# Patient Record
Sex: MALE | Race: WHITE
[De-identification: names, ages, dates, MRNs, and addresses within clinical notes are randomized per-mention and may not be internally consistent; named-entity substitution may affect disease eponyms.]

---

## 2019-03-29 ENCOUNTER — HOSPITAL ENCOUNTER (OUTPATIENT)
Dept: HOSPITAL 56 - MW.SDS | Age: 59
Discharge: HOME | End: 2019-03-29
Attending: SURGERY
Payer: COMMERCIAL

## 2019-03-29 DIAGNOSIS — Z88.5: ICD-10-CM

## 2019-03-29 DIAGNOSIS — K62.1: ICD-10-CM

## 2019-03-29 DIAGNOSIS — Z79.890: ICD-10-CM

## 2019-03-29 DIAGNOSIS — E03.9: ICD-10-CM

## 2019-03-29 DIAGNOSIS — K20.9: ICD-10-CM

## 2019-03-29 DIAGNOSIS — Z91.030: ICD-10-CM

## 2019-03-29 DIAGNOSIS — Z79.899: ICD-10-CM

## 2019-03-29 DIAGNOSIS — K63.9: ICD-10-CM

## 2019-03-29 DIAGNOSIS — K29.50: ICD-10-CM

## 2019-03-29 DIAGNOSIS — K57.30: ICD-10-CM

## 2019-03-29 DIAGNOSIS — I10: ICD-10-CM

## 2019-03-29 DIAGNOSIS — K21.9: ICD-10-CM

## 2019-03-29 DIAGNOSIS — K64.8: ICD-10-CM

## 2019-03-29 DIAGNOSIS — K62.5: Primary | ICD-10-CM

## 2019-03-29 PROCEDURE — 45380 COLONOSCOPY AND BIOPSY: CPT

## 2019-03-29 PROCEDURE — 43239 EGD BIOPSY SINGLE/MULTIPLE: CPT

## 2019-03-29 NOTE — PCM.PREANE
Preanesthetic Assessment





- Anesthesia/Transfusion/Family Hx


Anesthesia History: Prior Anesthesia Without Reaction


Family History of Anesthesia Reaction: No


Transfusion History: No Prior Transfusion(s)


Intubation History: Unknown





- Review of Systems


General: No Symptoms


Pulmonary: No Symptoms


Cardiovascular: No Symptoms


Gastrointestinal: Abdominal Pain, Difficulty Swallowing, Hematochezia


Neurological: No Symptoms


Other: Reports: None





- Physical Assessment


NPO Status Date: 03/29/19


NPO Status Time: 06:00


O2 Sat by Pulse Oximetry: 98


Respiratory Rate: 15


Vital Signs: 





 Last Vital Signs











Temp  36.4 C   03/29/19 09:20


 


Pulse  57 L  03/29/19 09:20


 


Resp  15   03/29/19 09:20


 


BP  132/85   03/29/19 09:20


 


Pulse Ox  98   03/29/19 09:20











Height: 1.85 m


Weight: 95.708 kg


ASA Class: 2


Mental Status: Alert & Oriented x3


Airway Class: Mallampati = 2


Dentition: Reports: Normal Dentition


Thyro-Mental Finger Breadths: 3


Mouth Opening Finger Breadths: 3


ROM/Head Extension: Full


Lungs: Clear to Auscultation, Normal Respiratory Effort


Cardiovascular: Regular Rate, Regular Rhythm





- Allergies


Allergies/Adverse Reactions: 


 Allergies











Allergy/AdvReac Type Severity Reaction Status Date / Time


 


bee venom protein (honey bee) Allergy  Anaphylactic Verified 03/26/19 08:37





   Shock  


 


codeine Allergy  Itching Verified 03/26/19 08:37














- Blood


Blood Available: No





- Anesthesia Plan


Pre-Op Medication Ordered: None





- Acknowledgements


Anesthesia Type Planned: MAC


Pt an Appropriate Candidate for the Planned Anesthesia: Yes


Alternatives and Risks of Anesthesia Discussed w Pt/Guardian: Yes


Pt/Guardian Understands and Agrees with Anesthesia Plan: Yes





PreAnesthesia Questionnaire


HEENT History: Reports: Other (See Below)


Other HEENT History: wears glasses


Cardiovascular History: Reports: Hypertension


Respiratory History: Reports: None


Gastrointestinal History: Reports: GERD, Other (See Below)


Other Gastrointestinal History: hx C-diff


Genitourinary History: Reports: None


Musculoskeletal History: Reports: Fracture


Other Musculoskeletal History: hx fx arm, collarbone, toe and finger


Neurological History: Reports: None


Psychiatric History: Reports: Anxiety, Depression


Endocrine/Metabolic History: Reports: Hypothyroidism


Hematologic History: Reports: None


Immunologic History: Reports: None


Oncologic (Cancer) History: Reports: None


Dermatologic History: Reports: None





- Past Surgical History


Head Surgeries/Procedures: Reports: None


HEENT Surgical History: Reports: Tonsillectomy


Cardiovascular Surgical History: Reports: None


Respiratory Surgical History: Reports: None


GI Surgical History: Reports: None


Female  Surgical History: Reports: None


Neurological Surgical History: Reports: None


Musculoskeletal Surgical History: Reports: Shoulder Surgery


Other Musculoskeletal Surgeries/Procedures:: herman shoulder surgery


Oncologic Surgical History: Reports: None


Dermatological Surgical History: Reports: None





- SUBSTANCE USE


Smoking Status *Q: Never Smoker


Recreational Drug Use History: No





- HOME MEDS


Home Medications: 


 Home Meds





Ascorbic Acid [Vitamin C] 500 mg PO DAILY 03/26/19 [History]


L.acidoph,Paracasei, B.lactis [Probiotic] 1 tab PO DAILY 03/26/19 [History]


Levothyroxine Sodium [Synthroid] 150 mcg PO DAILY 03/26/19 [History]


Lisinopril 20 mg PO DAILY 03/26/19 [History]


Metoprolol Succinate 100 mg PO BID 03/26/19 [History]


Potassium Chloride 10 meq PO DAILY 03/26/19 [History]


Zolpidem Tartrate 10 mg PO BEDTIME 03/26/19 [History]


hydroCHLOROthiazide [Hydrochlorothiazide] 25 mg PO DAILY 03/26/19 [History]











- CURRENT (IN HOUSE) MEDS


Current Meds: 





 Current Medications





Lactated Ringer's (Ringers, Lactated)  1,000 mls @ 125 mls/hr IV ASDIRECTED [facility]


   Last Admin: 03/29/19 09:29 Dose:  125 mls/hr

## 2019-03-29 NOTE — [id number]
Post Anesthesia Note





- EVALUATION WITHIN 48HRS OF ANESTHETIC


Vital Signs in Normal Range: Yes


Patient Participated in Evaluation: Yes


Respiratory Function Stable: Yes


Airway Patent: Yes


Cardiovascular Function Stable: Yes


Hydration Status Stable: Yes


Pain Control Satisfactory: Yes


Nausea and Vomiting Control Satisfactory: Yes


Mental Status Recovered: Yes


Resp Rate: 12





- COMMENTS/OBSERVATIONS


Free Text/Narrative:: 





no anesthesia problems

## 2019-03-29 NOTE — PCM.OPNOTE
- General Post-Op/Procedure Note


Date of Surgery/Procedure: 03/29/19


Operative Procedure(s): egd w bx.  colonoscopy w bx


Findings: 





see dict [id number]


Pre Op Diagnosis: BRBPR


Post-Op Diagnosis: Same


Anesthesia Technique: Moderate Sedation


Primary Surgeon: Freddie Medel


Pathology: 





egd bx


colon at 25 when scope coming out, hyperemic, and rectal polyp 2mm


Complications: None


Condition: Good

## 2019-03-29 NOTE — OR
SURGEON:

Freddie Medel MD

 

DATE OF PROCEDURE:  03/29/2019

 

PREOPERATIVE DIAGNOSIS:

Bright red blood per rectum.

 

POSTOPERATIVE DIAGNOSES:

Esophagogastroduodenoscopy diagnosis is duodenitis and colonoscopy diagnosis is

polyp and hemorrhoids.

 

DESCRIPTION OF PROCEDURE:

EGD:  The patient was taken to the endoscopy room, and with the CRNA, Diprivan

was administered.

 

A well-lubricated EGD scope was gently inserted through the oropharynx, down the

esophagus, passing through the gastroesophageal junction, into the stomach.  The

mucosa was examined upon the passage.  Any etiology will be noted.  Once in the

stomach, we continued to advance to the distal antrum, passed through the

pylorus into the second portion of the duodenum.  Again, the mucosa was examined

for any abnormality and etiology.

 

The scope was then retrieved back to the stomach and then retroflexed to look at

the fundus of the stomach.  If a biopsy was indicated, we will biopsy the

antrum, body, and gastroesophageal junction.  The air will be sucked out while

the scope is retrieved to reduce the patient's discomfort.

 

The patient tolerated the procedure well.  There were no intraoperative

complications.  Dr. Medel was present through the whole procedure.

 

Prior to surgery, a time-out had been called, the patient identified, procedure

identified and antibiotic administered.

 

Colonoscopy:  The patient was taken to the endoscopy room.  A time out was

called, patient identified, and procedure identified.  Diprivan was then

administrated.  Patient went from awake to sleep, hearing doctor talking or door

closing is normal.  Perineum inspection and digital examination were then

performed.  A well-lubricated colonoscope was gently inserted through the

rectum, advanced past the rectosigmoid junction, the descending colon, splenic

flexure, transverse colon, hepatic flexure, ascending colon, arrived to the

cecum.  Cecum was identified as dictated in the finding.  Then the scope was

carefully withdrawn while attention was paid to the mucosal surface for any

abnormality.  Air will be sucked out during the scope withdrawal.  At the

rectum, retroflexed to examine any rectal diseases, fistula or hemorrhoids.

During mucosal examination, abnormality or polyp was noted; picture taken and

biopsy performed.  Patient tolerated procedure well.  There were no

intraoperative complications, and Dr. Medel was present throughout the whole

procedure.

 

FINDINGS:

EGD findings:

1. The patient is easily sedated with CRNA and Diprivan, the patient is

    soundly snoring.

2. Oropharynx and proximal esophagus are free of disease.  Distal esophagus at

    GE junction at 40 shows mild salmon-color change, consistent with mild acid

    reflux.  Stomach rugae normal in appearance and there is no food, blood, or

    bile observed.  Antrum is normal in appearance.  Duodenum has inflammation

    and consistent with duodenitis.  No blood, no ulcer observed.  Scope

    retrieved back to the stomach.  Retroflexed look at the stomach, there was

    no hiatal hernia.  Biopsy done at antrum, body, GE junction at 40 and

    sucked out the gas while scope coming out.

Colonoscopy findings:

1. The patient is easily sedated with CRNA and Diprivan, the patient is

    soundly snoring.

2. Bowel prep is average with large amount of opaque liquid stool and study is

    compromised.

3. Colon is rather straightforward.  Cecum indicated by ileocecal fold, one-to-

    one indentation, light emittance, appendiceal orifice.  Mucosa examined

    upon scope pulling out.  The patient has moderate diverticulosis on the

    left colon.  No signs or symptoms of diverticulitis and there is a small

    polyp at the rectum, 2 mm, biopsy done.  Other than that, there is a small

    slightly elevated and hyperemic area at distant 25, and we biopsied that.

    The hyperemic patch is about 5 to 6 mm.  Other than that, no inflammation,

    stricture, AV malformation, bleeding, ulceration observed.  The patient has

    mild internal hemorrhoids, no external hemorrhoids.

 

PLAN:

The patient would benefit from repeat colonoscopy 10 years from today or if

clinically indicated otherwise or if the biopsy pathology indicated otherwise.

 

 

SINA / TITO

DD:  03/29/2019 12:27:55

DT:  03/29/2019 16:15:06

Job #:  [id number]